# Patient Record
Sex: MALE | Race: ASIAN
[De-identification: names, ages, dates, MRNs, and addresses within clinical notes are randomized per-mention and may not be internally consistent; named-entity substitution may affect disease eponyms.]

---

## 2019-03-15 ENCOUNTER — HOSPITAL ENCOUNTER (EMERGENCY)
Dept: HOSPITAL 25 - ED | Age: 84
Discharge: HOME | End: 2019-03-15
Payer: MEDICARE

## 2019-03-15 VITALS — DIASTOLIC BLOOD PRESSURE: 78 MMHG | SYSTOLIC BLOOD PRESSURE: 106 MMHG

## 2019-03-15 DIAGNOSIS — R06.02: ICD-10-CM

## 2019-03-15 DIAGNOSIS — I50.9: ICD-10-CM

## 2019-03-15 DIAGNOSIS — J98.01: Primary | ICD-10-CM

## 2019-03-15 DIAGNOSIS — Z88.2: ICD-10-CM

## 2019-03-15 DIAGNOSIS — I10: ICD-10-CM

## 2019-03-15 DIAGNOSIS — Z79.82: ICD-10-CM

## 2019-03-15 LAB
ALBUMIN SERPL BCG-MCNC: 4.8 G/DL (ref 3.2–5.2)
ALBUMIN/GLOB SERPL: 1.9 {RATIO} (ref 1–3)
ALP SERPL-CCNC: 55 U/L (ref 34–104)
ALT SERPL W P-5'-P-CCNC: 26 U/L (ref 7–52)
ANION GAP SERPL CALC-SCNC: 7 MMOL/L (ref 2–11)
AST SERPL-CCNC: 34 U/L (ref 13–39)
BASOPHILS # BLD AUTO: 0.1 10^3/UL (ref 0–0.2)
BUN SERPL-MCNC: 32 MG/DL (ref 6–24)
BUN/CREAT SERPL: 31.4 (ref 8–20)
CALCIUM SERPL-MCNC: 9.8 MG/DL (ref 8.6–10.3)
CHLORIDE SERPL-SCNC: 102 MMOL/L (ref 101–111)
EOSINOPHIL # BLD AUTO: 0.2 10^3/UL (ref 0–0.6)
GLOBULIN SER CALC-MCNC: 2.5 G/DL (ref 2–4)
GLUCOSE SERPL-MCNC: 115 MG/DL (ref 70–100)
HCO3 SERPL-SCNC: 25 MMOL/L (ref 22–32)
HCT VFR BLD AUTO: 49 % (ref 36–46)
HGB BLD-MCNC: 16.5 G/DL (ref 14–18)
INR PPP/BLD: 1.17 (ref 0.77–1.02)
LYMPHOCYTES # BLD AUTO: 0.4 10^3/UL (ref 1–4.8)
MCH RBC QN AUTO: 29 PG (ref 27–31)
MCHC RBC AUTO-ENTMCNC: 33 G/DL (ref 31–36)
MCV RBC AUTO: 88 FL (ref 80–94)
MONOCYTES # BLD AUTO: 0.7 10^3/UL (ref 0–0.8)
NEUTROPHILS # BLD AUTO: 5 10^3/UL (ref 1.5–7.7)
NRBC # BLD AUTO: 0 10^3/UL
NRBC BLD QL AUTO: 0
PLATELET # BLD AUTO: 161 10^3/UL (ref 150–450)
POTASSIUM SERPL-SCNC: 3.7 MMOL/L (ref 3.5–5)
PROT SERPL-MCNC: 7.3 G/DL (ref 6.4–8.9)
RBC # BLD AUTO: 5.61 10^6 /UL (ref 4.18–5.48)
SODIUM SERPL-SCNC: 134 MMOL/L (ref 135–145)
TROPONIN I SERPL-MCNC: 0.01 NG/ML (ref ?–0.04)
WBC # BLD AUTO: 6.3 10^3/UL (ref 3.5–10.8)

## 2019-03-15 PROCEDURE — 96374 THER/PROPH/DIAG INJ IV PUSH: CPT

## 2019-03-15 PROCEDURE — 80053 COMPREHEN METABOLIC PANEL: CPT

## 2019-03-15 PROCEDURE — 99284 EMERGENCY DEPT VISIT MOD MDM: CPT

## 2019-03-15 PROCEDURE — 85025 COMPLETE CBC W/AUTO DIFF WBC: CPT

## 2019-03-15 PROCEDURE — 85610 PROTHROMBIN TIME: CPT

## 2019-03-15 PROCEDURE — 36415 COLL VENOUS BLD VENIPUNCTURE: CPT

## 2019-03-15 PROCEDURE — 83880 ASSAY OF NATRIURETIC PEPTIDE: CPT

## 2019-03-15 PROCEDURE — 85379 FIBRIN DEGRADATION QUANT: CPT

## 2019-03-15 PROCEDURE — 83605 ASSAY OF LACTIC ACID: CPT

## 2019-03-15 PROCEDURE — 71045 X-RAY EXAM CHEST 1 VIEW: CPT

## 2019-03-15 PROCEDURE — 87040 BLOOD CULTURE FOR BACTERIA: CPT

## 2019-03-15 PROCEDURE — 93005 ELECTROCARDIOGRAM TRACING: CPT

## 2019-03-15 PROCEDURE — 86140 C-REACTIVE PROTEIN: CPT

## 2019-03-15 PROCEDURE — 84484 ASSAY OF TROPONIN QUANT: CPT

## 2019-03-15 NOTE — ED
Shortness of Breath





- HPI Summary


HPI Summary: 





An 83 y/o male presents to [facility] with a chief complaint of SOB today. At triage 

he rated his pain as a 3/10 in severity. Lying down aggravates his SOB and 

sitting up alleviates his SOB. He is a nonsmoker. He reports taking baby 

aspirin every night. Hx of aneurysm. SHx of bypass 3 years ago and valve 

replacement. Vital signs while in room  HR:100 bpm, O2 Sat on 4L O2: 90 when 

lying down and 95 when sitting up, BP: 188/126.





- History of Current Complaint


Chief Complaint: EDShortnessOfBreath


Time Seen by Provider: 03/15/19 11:55


Hx Obtained From: Patient


Onset/Duration: Sudden Onset, Lasting Hours


Timing: Constant


Current Severity: Mild


Dyspnea At: Rest


Aggrevating Factors: Other - lying down


Alleviating Factors: Upright Position


Associated Signs & Symptoms: Negative





- Allergy/Home Medications


Allergies/Adverse Reactions: 


 Allergies











Allergy/AdvReac Type Severity Reaction Status Date / Time


 


MS Sulfa Antibiotics Allergy  Rash Verified 08/02/16 08:04





[Sulfa Antibiotics]     


 


enviromental Allergy  Runny Nose Uncoded 08/02/16 08:04











Home Medications: 


 Home Medications





Aspirin EC TAB* [Ecotrin EC Low Dose 81 MG*] 81 mg PO DAILY 03/15/19 [History 

Confirmed 03/15/19]


Atorvastatin* [Lipitor*] 10 mg PO BEDTIME 03/15/19 [History Confirmed 03/15/19]


Budesonide Flexhaler 180 (NF) [Pulmicort Flexhaler 180 mcg/act (NF)] 2 puff INH 

BID 03/15/19 [History Confirmed 03/15/19]


Epleronone (NF) [Inspra (NF)] 25 mg PO EVERY OTHER DAY 03/15/19 [History 

Confirmed 03/15/19]


Potassium Chlor TAB* [Klor Con ER TAB*] 20 meq PO EVERY OTHER DAY 03/15/19 [

History Confirmed 03/15/19]


dilTIAZem HCl [Tiazac] 120 mg PO DAILY 03/15/19 [History Confirmed 03/15/19]











PMH/Surg Hx/FS Hx/Imm Hx


Endocrine/Hematology History: 


   Denies: Hx Diabetes, Hx Thyroid Disease


Cardiovascular History: Reports: Hx Aneurysm, Hx Congestive Heart Failure - 

2014  post surgery, Hx Hypertension


Respiratory History: 


   Denies: Hx Asthma, Hx Chronic Obstructive Pulmonary Disease (COPD)


GI History: Reports: Other GI Disorders - hx of diverticulitis


   Denies: Hx Ulcer





- Cancer History


Cancer Type, Location and Year: prostate





- Surgical History


Surgery Procedure, Year, and Place: prostate surgery, aortic valve replacement,

aneurysm,CABG


Infectious Disease History: 


   Denies: Hx Clostridium Difficile, Hx Hepatitis, Hx Human Immunodeficiency 

Virus (HIV), Hx of Known/Suspected MRSA, Hx Shingles, Hx Tuberculosis, Hx Known/

Suspected VRE, Hx Known/Suspected VRSA, History Other Infectious Disease, 

Traveled Outside the [place] in Last 30 Days





- Family History


Known Family History: 


   Negative: Hypertension





- Social History


Alcohol Use: None


Substance Use Type: Reports: None


Smoking Status (MU): Never Smoked Tobacco





Review of Systems


Negative: Fever


Positive: Shortness Of Breath


All Other Systems Reviewed And Are Negative: Yes





Physical Exam





- Summary


Physical Exam Summary: 





Appearance: The patient is well-nourished in no acute distress and in no acute 

pain.


 


Skin: The skin is warm and dry and skin color reflects adequate perfusion.





HEENT: The head is normocephalic and atraumatic. The pupils are equal and 

reactive. The conjunctivae are clear and without drainage. Nares are patent and 

without drainage. Mouth reveals moist mucous membranes and the throat is 

without erythema and exudate. The external ears are intact. The ear canals are 

patent and without drainage. The tympanic membranes are intact.


 


Neck: The neck is supple with full range of motion and non-tender. There are no 

carotid bruits. There is slight JVD.


 


Respiratory: Chest is non-tender. Lungs sounds tight with rare wheezes. No 

crackles.


 


Cardiovascular: Heart is borderline tachycardic. There is no murmur or rub 

auscultated. There is no peripheral edema and pulses are symmetrical and equal.


 


Abdomen: The abdomen is soft and non-tender. There are normal bowel sounds 

heard in all four quadrants and there is no organomegaly palpated.


 


Musculoskeletal: There is no back tenderness noted. Extremities are non-tender 

with full range of motion. There is good capillary refill. There is no 

peripheral edema or calf tenderness elicited.


 


Neurological: Patient is alert and oriented to person, place and time. The 

patient has symmetrical motor strength in all four extremities. Cranial nerves 

are grossly intact. Deep tendon reflexes are symmetrical and equal in all four 

extremities.


 


Psychiatric: The patient has an appropriate affect and does not exhibit any 

anxiety or depression.


Triage Information Reviewed: Yes


Vital Signs On Initial Exam: 


 Initial Vitals











Temp Pulse Resp BP Pulse Ox


 


 97.6 F   102   22   0/0   90 


 


 03/15/19 11:56  03/15/19 11:56  03/15/19 11:56  03/15/19 11:56  03/15/19 11:56











Vital Signs Reviewed: Yes





Diagnostics





- Vital Signs


 Vital Signs











  Temp Pulse Resp BP Pulse Ox


 


 03/15/19 11:56  97.6 F  102  22  0/0  90














- Laboratory


Result Diagrams: 


 03/15/19 12:14





 03/15/19 12:14


Lab Statement: Any lab studies that have been ordered have been reviewed, and 

results considered in the medical decision making process.





- Radiology


  ** CXR


Radiology Interpretation Completed By: Radiologist


Summary of Radiographic Findings: NO ACTIVE CARDIOPULMONARY DISEASE IS NOTED.  

ED physician has reviewed this imaging report.





- EKG


  ** 12:02


Cardiac Rate: NL - 98 bpm


EKG Rhythm: Sinus Rhythm


Summary of EKG Findings: Normal sinus rhythm at 98 bpm 1st degree block 

prolonged QTc





Re-Evaluation





- Re-Evaluation


  ** First Eval


Re-Evaluation Time: 13:46


Change: Improved


Comment: Patient feels better. He will be taken off of O2 to see how he feels 

without O2.





  ** Second Eval


Re-Evaluation Time: 14:41


Change: Unchanged


Comment: Patient walking around with HR around 102 and O2 sat around 87





  ** Third Eval


Re-Evaluation Time: 15:50


Change: Improved


Comment: Patient is feeling better.





Course/Dx





- Course


Course Of Treatment: Mr. Koehler had the sudden onset of shortness of breath 

today.  On initial evaluation he denied any pulmonary problems but stated that 

he had cardiac problems.  He is shortness breath was exacerbated by lying down 

and he was better when he was up moving around.  On exam he was clearly short 

of breath and tachycardic.  He had no peripheral edema.  He had mild JVD and no 

HJR.  Lungs sounds were diminished with an increased EI and rare wheezes 

consistent with a bronchospastic problem.  He was given duo neb and Solu-Medrol 

while chest x-ray and labs were obtained.  He improved significantly and his 

workup was consistent with a bronchospastic problem.  At that point he revealed 

that he was on 3 different nebulizers at home although they all sound related 

if he scalenectomy correctly.  They were Xopenex, Ventolin and ProAir.  He 

remained mildly tachycardic when ambulated with his pulse ox around 90-93 at 

rest and dropping to 88 with ambulation.  I recommended that we keep him 

overnight in the hospital but he was adamant that he would not stay.  I 

discharged him with a prescription for a Medrol Dosepak and an Atrovent inhaler 

to go with the albuterol he has at home.





- Diagnoses


Provider Diagnoses: 


 Bronchospasm








Discharge





- Sign-Out/Discharge


Documenting (check all that apply): Patient Departure - DC


Patient Received Moderate/Deep Sedation with Procedure: No





- Discharge Plan


Condition: Stable


Disposition: HOME


Prescriptions: 


Ipratropium HFA INHALER(NF) [Atrovent Hfa Inhaler(NF)] 1 puff INH Q6H #1 mdi


methylPREDNISolone [Medrol Dosepak 4 MG*] 4 mg PO .SEE BRENT INSTRUCTION #1 tab


Patient Education Materials:  Bronchospasm (ED)


Referrals: 


David Hunt MD [Primary Care Provider] - As Soon As Possible


Additional Instructions: 


Return to the ED if you experience any new or worsening symptoms.





- Billing Disposition and Condition


Condition: STABLE


Disposition: Home





- Attestation Statements


Document Initiated by Scribe: Yes


Documenting Scribe: Kings Guzman


Provider For Whom Nora is Documenting (Include Credential): Thanh Reyes MD


Scribe Attestation: 


IKings, scribed for Thanh Reyes MD on 03/15/19 at 1748. 


Scribe Documentation Reviewed: Yes


Provider Attestation: 


The documentation as recorded by the Kings chavira accurately 

reflects the service I personally performed and the decisions made by me, 

Thanh Reyes MD


Status of Scribe Document: Viewed